# Patient Record
Sex: MALE | Race: BLACK OR AFRICAN AMERICAN | NOT HISPANIC OR LATINO | URBAN - METROPOLITAN AREA
[De-identification: names, ages, dates, MRNs, and addresses within clinical notes are randomized per-mention and may not be internally consistent; named-entity substitution may affect disease eponyms.]

---

## 2019-10-14 ENCOUNTER — COMMUNICATION - HEALTHEAST (OUTPATIENT)
Dept: NEUROSURGERY | Facility: CLINIC | Age: 54
End: 2019-10-14

## 2021-06-02 NOTE — TELEPHONE ENCOUNTER
PATIENT NAME:  Júnior Darden  YOB: 1965  MRN: [id number]  SURGEON: Dr. 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 Last MRI in August. Symptoms persist. Recently had an injection.    FOLLOW-UP:    If no focal weakness or acute change would recommend patient follow up with his established Neurosurgery service at [facility] as they recommended (six weeks from 10/3/2019).            ADDITIONAL INSTRUCTIONS FOR MEDICAL STAFF:    Agree with steroids as ordered by hospitalist. Could also increase gabapentin, consider a pain clinic consultation.

## 2021-06-16 PROBLEM — F10.20 ALCOHOL USE DISORDER, MODERATE, IN CONTROLLED ENVIRONMENT (H): Status: ACTIVE | Noted: 2019-10-14

## 2021-06-16 PROBLEM — F14.10 COCAINE USE DISORDER (H): Status: ACTIVE | Noted: 2019-10-14

## 2021-06-16 PROBLEM — F29 PSYCHOSIS, UNSPECIFIED PSYCHOSIS TYPE (H): Status: ACTIVE | Noted: 2019-11-25

## 2021-06-16 PROBLEM — R45.851 SUICIDAL IDEATION: Status: ACTIVE | Noted: 2019-11-25

## 2021-06-16 PROBLEM — R45.850 HOMICIDAL IDEATIONS: Status: ACTIVE | Noted: 2019-10-07

## 2021-06-16 PROBLEM — E44.0 MODERATE PROTEIN-CALORIE MALNUTRITION (H): Status: ACTIVE | Noted: 2019-10-14

## 2021-06-16 PROBLEM — Z21 HIV POSITIVE (H): Status: ACTIVE | Noted: 2019-10-14

## 2021-06-16 PROBLEM — R45.851 SUICIDAL IDEATIONS: Status: ACTIVE | Noted: 2019-10-06

## 2021-07-14 PROBLEM — F29 PSYCHOSIS (H): Status: RESOLVED | Noted: 2019-10-06 | Resolved: 2019-10-14

## 2023-05-28 ENCOUNTER — LAB REQUISITION (OUTPATIENT)
Dept: LAB | Facility: CLINIC | Age: 58
End: 2023-05-28
Payer: MEDICARE

## 2023-05-28 DIAGNOSIS — E55.9 VITAMIN D DEFICIENCY, UNSPECIFIED: ICD-10-CM

## 2023-05-28 DIAGNOSIS — I10 ESSENTIAL (PRIMARY) HYPERTENSION: ICD-10-CM

## 2023-05-28 DIAGNOSIS — D63.8 ANEMIA IN OTHER CHRONIC DISEASES CLASSIFIED ELSEWHERE: ICD-10-CM
